# Patient Record
(demographics unavailable — no encounter records)

---

## 2024-10-08 NOTE — CURRENT MEDS
[TextEntry] : tamoxifen 20mg poqd bystolic 20mg po qd plavix 75mg poqd asa 81mg losartan 100mg poqd Valtrex 500mg poqd Livolo 2mg Metformin 500mg 2 tabs qd Glimeperide 2mg

## 2024-10-08 NOTE — PHYSICAL EXAM
[General Appearance - Alert] : alert [General Appearance - In No Acute Distress] : in no acute distress [Sclera] : the sclera and conjunctiva were normal [PERRL With Normal Accommodation] : pupils were equal in size, round, and reactive to light [Extraocular Movements] : extraocular movements were intact [Outer Ear] : the ears and nose were normal in appearance [Oropharynx] : the oropharynx was normal [Neck Appearance] : the appearance of the neck was normal [Neck Cervical Mass (___cm)] : no neck mass was observed [Jugular Venous Distention Increased] : there was no jugular-venous distention [Thyroid Diffuse Enlargement] : the thyroid was not enlarged [Thyroid Nodule] : there were no palpable thyroid nodules [Auscultation Breath Sounds / Voice Sounds] : lungs were clear to auscultation bilaterally [Heart Rate And Rhythm] : heart rate was normal and rhythm regular [Heart Sounds] : normal S1 and S2 [Heart Sounds Gallop] : no gallops [Murmurs] : no murmurs [Heart Sounds Pericardial Friction Rub] : no pericardial rub [Full Pulse] : the pedal pulses are present [Edema] : there was no peripheral edema [Bowel Sounds] : normal bowel sounds [Abdomen Soft] : soft [Abdomen Tenderness] : non-tender [Abdomen Mass (___ Cm)] : no abdominal mass palpated [Abnormal Walk] : normal gait [Nail Clubbing] : no clubbing  or cyanosis of the fingernails [Musculoskeletal - Swelling] : no joint swelling seen [Motor Tone] : muscle strength and tone were normal [Skin Color & Pigmentation] : normal skin color and pigmentation [Skin Turgor] : normal skin turgor [] : no rash [No Focal Deficits] : no focal deficits [Oriented To Time, Place, And Person] : oriented to person, place, and time [Impaired Insight] : insight and judgment were intact [Affect] : the affect was normal

## 2024-10-08 NOTE — SOCIAL HISTORY
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 Lives with her son. Has a daughter also and both are healthy.

## 2024-10-08 NOTE — HISTORY OF PRESENT ILLNESS
[FreeTextEntry1] : 54F with NIDDM, CAD s/p PCI x 2 in 2016, HTN, RCC s/p R partial nephrectomy here to establish care for CKD and proteinuria. Pt has a history of breast cancer 2016 s/p B mastectomy and reconstruction.   Pt has HTN and takes meds for it. 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   Her last stress test was 2 yrs ago and she had a Cardiac CT and it was stable- followed at [facility].   No LE swelling. She gets shortness of breath when she takes atenolol at night. She stopped taking it and was told to start hydralazine but she was nervous to  take it so did not. No bubbly or foamy urine. No blood in the urine. No dysuria. No rashes. No flank pain or back pain. No history of kidney stones.   Cardiology: Chidi Thakkar Endo: Dr. Chiara Andres in [place] PCP Henrietta Blackwell

## 2024-10-08 NOTE — PLAN
[TextEntry] : Will quantify proteinuria today. Labs to eval for possible causes however likely DM and obesity related. Will start hydralazine 25mg po bid in addition to ARB and bystolic. Will monitor BP daily x 2 wks and call me with readings. Would benefit from SGLT2 inh but she had a positive UA on last labs. Will repeat today. Per pt no history of recurrent UTIs.  Will discuss follow up at the time of call.

## 2025-01-23 NOTE — HISTORY OF PRESENT ILLNESS
[FreeTextEntry1] : : Sep 15 1970  Referring Provider: KATHIA ARIAS,JUDITH   HPI: Ms. ANDRÉS DURÁN is a 53 year yo F with a PMHx notable for recently diagnosed renal mass 2.7 cm in the R lower pole. Dr. Chand had seen her back in 2016.  She had a history of breast cancer with a total mastectomy and reconstruction.  When she had a CT scan, it showed a 13 mm complex cyst off the lower pole of the right kidney and the MRI showed that it was stable.  Her most recent imaging study was an MRI last year that once again showed stability of the 14 mm complex cystic lesion in the lower pole of the right kidney with an internal septation.  She has not seen any blood in her urine.  She has had no burning, frequency, urgency, dysuria or hematuria.   Anticoagulation: Plavix All: NKDA Social:  social EtOH, never smoker,  PMHx: recently diagnosed with DM, on glimepride, seeing endocrinologist FHx: 2 aunts with cancer PSHx: mastectomy, ACDF neck surgery, cardiac stents Labs: None Imaging: MR in system with 2.7 cm RLP lesion  : Here today for follow up. PAthology with pT1a 3.0 cm and Grade 3- places her at AUA intermediate risk.   : Patient presents for follow up. She recently underwent MR abdomen and CT chest, showing no evidence of recurrence or metastasis and stable pulmonary nodules. AUA intermediate risk.   25 Pt presents for f/u for renal lesion- s/p partial nephrectomy for ccRCC, pT1a, intermediate risk - MRI abdomen without any evidence of disease and CT chest with stable pulmonary nodules.   [Urinary Incontinence] : no urinary incontinence [Urinary Retention] : no urinary retention [Urinary Urgency] : no urinary urgency [Urinary Frequency] : no urinary frequency

## 2025-01-23 NOTE — PHYSICAL EXAM
[General Appearance - Well Developed] : well developed [General Appearance - Well Nourished] : well nourished [Heart Rate And Rhythm] : heart rate and rhythm were normal [] : no respiratory distress [Respiration, Rhythm And Depth] : normal respiratory rhythm and effort [Normal Station and Gait] : the gait and station were normal for the patient's age [Skin Turgor] : supple [Skin Color & Pigmentation] : normal skin color and pigmentation [No Focal Deficits] : no focal deficits [Oriented To Time, Place, And Person] : oriented to person, place, and time [Not Anxious] : not anxious [de-identified] : incisoins c/d/i

## 2025-01-23 NOTE — ASSESSMENT
[FreeTextEntry1] : 53 yo F with RCC AUA intermediate risk  #RCC - MR and CT chest stable - CT chest + MR abdomen in June - [facility] Dr. Lopez In July

## 2025-04-04 NOTE — HISTORY OF PRESENT ILLNESS
[Disease: _____________________] : Disease: [unfilled] [T: ___] : T[unfilled] [N: ___] : N[unfilled] [AJCC Stage: ____] : AJCC Stage: [unfilled] [de-identified] : Age 41: atypical hyperplasia/ LCIS: she had right breast lumpectomy which showed atypical ductal hyperplasia and LCIS. At that time, she was offered tamoxifen but held off due to uterine cancer risk. She continued with breast surveillance with last mammo/ sonogram on 10/2015 which did not show any suspicious changes.  Age 46: left breast cancer She had MRI of the breast on 5/23/16 which showed innumerable nonspecific foci of enhancement bilaterally. She underwent MRI guided breast biopsies of the left breast in 6/2016 that showed DCIS. On 7/8/16, she underwent prophylactic right mastectomy with sentinel lymph node biopsy that showed DCIS and LCIS with negative sentinel lymph nodes. She had left mastectomy with sentinel lymph node biopsy that showed well differentiated invasive ductal carcinoma that measured 0.4 cm along with DCIS and negative LN. The immunostains for the invasive carcinoma is %, %, [id number] negative. She started on tamoxifen since 9/ 2016.  Age 54: RCC s/p resection: T1A followed by urology: Dr Juan Carlos Garsia. Has MRI abdomen and CT chest surveillance with Dr Garsia.     [de-identified] : well differentiated invasive ductal carcinoma %, %, [id number] negative; DCIS and LCIS in right breast [de-identified] : tamoxifen 9/2016 to present  Amb 49 gene panel 2016: no actionable mutations [de-identified] : Since last evaluation, she had CT chest done showing stable pulmonary nodules and MRI of the abdomen s/p partial R nephrectomy and no evidence of disease. She is wondering if she could have GLP 1 agonists. Denies any new breast pain or chest wall changes. No back pain, cough or HA. She continues to tolerate tamoxifen without any new side effects.

## 2025-04-04 NOTE — REVIEW OF SYSTEMS
[Recent Change In Weight] : ~T recent weight change [Diarrhea: Grade 0] : Diarrhea: Grade 0 [Negative] : Allergic/Immunologic [Fever] : no fever [Chills] : no chills [Night Sweats] : no night sweats [Fatigue] : no fatigue [FreeTextEntry2] : wt gain

## 2025-04-04 NOTE — CONSULT LETTER
[Dear  ___] : Dear  [unfilled], [Courtesy Letter:] : I had the pleasure of seeing your patient, [unfilled], in my office today. [Please see my note below.] : Please see my note below. [Consult Closing:] : Thank you very much for allowing me to participate in the care of this patient.  If you have any questions, please do not hesitate to contact me. [Sincerely,] : Sincerely, [FreeTextEntry2] : Henrietta Blackwell MD [address] [place], [place] [id number] [FreeTextEntry3] : Luke Owens MD Attending [facility]

## 2025-04-04 NOTE — ASSESSMENT
[FreeTextEntry1] : She is a 53 y/o BRCA negative perimenopausal  F with prior history of right breast LCIS/ atypical hyperplasia now with right DCIS/ LCIS and left Stage I invasive ductal carcinoma and DCIS s/p mastectomy. She has been on tamoxifen and tolerating therapy since 2016. Reviewed her CT chest and MRI of the abdomen done 2025. She has no new signs or symptoms of breast cancer recurrence. She will continue with tamoxifen. Will check LFTs today along with lipid panel. Questions answered to her satisfaction. She is agreeable with plan. Next follow up in 6 months but earlier if any new symptoms. Will obtain thyroid u/s to evaluate for thyroid nodules prior to her starting on GLP1 agonists: her PCP will be starting her on therapy for weight loss.

## 2025-04-04 NOTE — CONSULT LETTER
[Dear  ___] : Dear  [unfilled], [Courtesy Letter:] : I had the pleasure of seeing your patient, [unfilled], in my office today. [Please see my note below.] : Please see my note below. [Consult Closing:] : Thank you very much for allowing me to participate in the care of this patient.  If you have any questions, please do not hesitate to contact me. [Sincerely,] : Sincerely, [FreeTextEntry2] : Henrietta Blackwell MD [address] [place], [place] [id number] [FreeTextEntry3] : Luke Owens MD Attending [facility]

## 2025-04-04 NOTE — PHYSICAL EXAM
[Fully active, able to carry on all pre-disease performance without restriction] : Status 0 - Fully active, able to carry on all pre-disease performance without restriction [Normal] : affect appropriate [de-identified] : bilateral mastectomy with RAMOS flap with fat necrosis changes in the right 11:00 and left 12:00; R axillary fullness compared to left;  no palpable axillary LN or skin nodules over the reconstruction site  [de-identified] : no reproducible pain with abduction and adduction of the hip; no erythema or calor over the inguinal site

## 2025-04-04 NOTE — PHYSICAL EXAM
[Fully active, able to carry on all pre-disease performance without restriction] : Status 0 - Fully active, able to carry on all pre-disease performance without restriction [Normal] : affect appropriate [de-identified] : bilateral mastectomy with RAMOS flap with fat necrosis changes in the right 11:00 and left 12:00; R axillary fullness compared to left;  no palpable axillary LN or skin nodules over the reconstruction site  [de-identified] : no reproducible pain with abduction and adduction of the hip; no erythema or calor over the inguinal site

## 2025-04-04 NOTE — HISTORY OF PRESENT ILLNESS
[Disease: _____________________] : Disease: [unfilled] [T: ___] : T[unfilled] [N: ___] : N[unfilled] [AJCC Stage: ____] : AJCC Stage: [unfilled] [de-identified] : Age 41: atypical hyperplasia/ LCIS: she had right breast lumpectomy which showed atypical ductal hyperplasia and LCIS. At that time, she was offered tamoxifen but held off due to uterine cancer risk. She continued with breast surveillance with last mammo/ sonogram on 10/2015 which did not show any suspicious changes.  Age 46: left breast cancer She had MRI of the breast on 5/23/16 which showed innumerable nonspecific foci of enhancement bilaterally. She underwent MRI guided breast biopsies of the left breast in 6/2016 that showed DCIS. On 7/8/16, she underwent prophylactic right mastectomy with sentinel lymph node biopsy that showed DCIS and LCIS with negative sentinel lymph nodes. She had left mastectomy with sentinel lymph node biopsy that showed well differentiated invasive ductal carcinoma that measured 0.4 cm along with DCIS and negative LN. The immunostains for the invasive carcinoma is %, %, [id number] negative. She started on tamoxifen since 9/ 2016.  Age 54: RCC s/p resection: T1A followed by urology: Dr Juan Carlos Garsia. Has MRI abdomen and CT chest surveillance with Dr Garsia.     [de-identified] : well differentiated invasive ductal carcinoma %, %, [id number] negative; DCIS and LCIS in right breast [de-identified] : tamoxifen 9/2016 to present  Amb 49 gene panel 2016: no actionable mutations [de-identified] : Since last evaluation, she had CT chest done showing stable pulmonary nodules and MRI of the abdomen s/p partial R nephrectomy and no evidence of disease. She is wondering if she could have GLP 1 agonists. Denies any new breast pain or chest wall changes. No back pain, cough or HA. She continues to tolerate tamoxifen without any new side effects.

## 2025-07-24 NOTE — HISTORY OF PRESENT ILLNESS
[FreeTextEntry1] : 53 yo female w/ breast cancer s/p b/l mastectomies and R partial nephrectomy in January 2024 (ccRCC, pT1a, G3).  MRI in May 2025 shows no evidence of local or metastatic disease. CT chest also without evidence of disease.  No gross hematuria or other complaints. Recently started Mounjaro 5 weeks ago.